# Patient Record
Sex: MALE | Race: BLACK OR AFRICAN AMERICAN | Employment: UNEMPLOYED | URBAN - METROPOLITAN AREA
[De-identification: names, ages, dates, MRNs, and addresses within clinical notes are randomized per-mention and may not be internally consistent; named-entity substitution may affect disease eponyms.]

---

## 2022-05-25 ENCOUNTER — APPOINTMENT (OUTPATIENT)
Dept: GENERAL RADIOLOGY | Age: 52
End: 2022-05-25

## 2022-05-25 ENCOUNTER — HOSPITAL ENCOUNTER (EMERGENCY)
Age: 52
Discharge: HOME | End: 2022-05-25
Attending: EMERGENCY MEDICINE

## 2022-05-25 VITALS
DIASTOLIC BLOOD PRESSURE: 76 MMHG | HEART RATE: 72 BPM | SYSTOLIC BLOOD PRESSURE: 130 MMHG | OXYGEN SATURATION: 100 % | TEMPERATURE: 98.7 F | WEIGHT: 155.9 LBS | RESPIRATION RATE: 18 BRPM

## 2022-05-25 DIAGNOSIS — M25.532 LEFT WRIST PAIN: Primary | ICD-10-CM

## 2022-05-25 PROCEDURE — 99283 EMERGENCY DEPT VISIT LOW MDM: CPT

## 2022-05-25 PROCEDURE — 6370000000 HC RX 637 (ALT 250 FOR IP): Performed by: STUDENT IN AN ORGANIZED HEALTH CARE EDUCATION/TRAINING PROGRAM

## 2022-05-25 PROCEDURE — 73100 X-RAY EXAM OF WRIST: CPT

## 2022-05-25 PROCEDURE — 73110 X-RAY EXAM OF WRIST: CPT

## 2022-05-25 RX ORDER — IBUPROFEN 600 MG/1
600 TABLET ORAL 4 TIMES DAILY PRN
Qty: 40 TABLET | Refills: 0 | Status: SHIPPED | OUTPATIENT
Start: 2022-05-25

## 2022-05-25 RX ORDER — LIDOCAINE 4 G/G
1 PATCH TOPICAL DAILY
Status: DISCONTINUED | OUTPATIENT
Start: 2022-05-25 | End: 2022-05-25 | Stop reason: HOSPADM

## 2022-05-25 RX ORDER — ACETAMINOPHEN 500 MG
500 TABLET ORAL 4 TIMES DAILY PRN
Qty: 120 TABLET | Refills: 0 | Status: SHIPPED | OUTPATIENT
Start: 2022-05-25

## 2022-05-25 RX ORDER — ACETAMINOPHEN 325 MG/1
650 TABLET ORAL ONCE
Status: COMPLETED | OUTPATIENT
Start: 2022-05-25 | End: 2022-05-25

## 2022-05-25 RX ORDER — LIDOCAINE 50 MG/G
1 PATCH TOPICAL DAILY
Qty: 30 PATCH | Refills: 0 | Status: SHIPPED | OUTPATIENT
Start: 2022-05-25

## 2022-05-25 RX ORDER — IBUPROFEN 400 MG/1
400 TABLET ORAL ONCE
Status: COMPLETED | OUTPATIENT
Start: 2022-05-25 | End: 2022-05-25

## 2022-05-25 RX ADMIN — ACETAMINOPHEN 650 MG: 325 TABLET ORAL at 20:34

## 2022-05-25 RX ADMIN — IBUPROFEN 400 MG: 400 TABLET, FILM COATED ORAL at 20:34

## 2022-05-25 ASSESSMENT — ENCOUNTER SYMPTOMS
SHORTNESS OF BREATH: 0
NAUSEA: 0
ABDOMINAL DISTENTION: 0
VOMITING: 0
CHEST TIGHTNESS: 0

## 2022-05-25 ASSESSMENT — PAIN SCALES - GENERAL: PAINLEVEL_OUTOF10: 7

## 2022-05-25 NOTE — ED TRIAGE NOTES
Patient presents to the ED, states he had a mechanical fall 4/19, landed outstretched on his left wrist, C/O pain, also C/O chronic lumbar pain, as he is a

## 2022-05-26 NOTE — ED PROVIDER NOTES
[address] [facility] [address] ENCOUNTER          EM RESIDENT NOTE       Date of evaluation: 5/25/2022    Chief Complaint     Wrist Pain and Back Pain      History of Present Illness     Jose Johnson is a left-hand-dominant 46 y.o. male with no medical problems who presents left wrist pain and back pain. Patient is coming into the emerge department primarily for left wrist pain that has been ongoing for approximately 1 month. Patient states that he fell approximately 1 month ago onto his left wrist and has been having pain since that time. He is a  and has not been able to get into the emergency department for evaluation. Pain is located in his left wrist.  Does not radiate. It is dull/achy. It is moderate nature. It is worse with palpation and certain movements. No alleviating factors. He denies any numbness/tingling or color changes to his left hand. Patient is also complaining of bilateral low back pain that has been ongoing for years due to the fact that he is a  and sits down for his job for prolonged period of time. No history of trauma or falls. He denies any bowel/urinary changes, fevers, IV drug use, saddle anesthesia. Review of Systems     Review of Systems   Constitutional: Negative for chills and fever. HENT: Negative for congestion and drooling. Respiratory: Negative for chest tightness and shortness of breath. Cardiovascular: Negative for chest pain and palpitations. Gastrointestinal: Negative for abdominal distention, nausea and vomiting. Musculoskeletal: Positive for arthralgias. Negative for gait problem. Skin: Negative for rash and wound. Neurological: Negative for dizziness and headaches. Psychiatric/Behavioral: Negative for agitation and confusion. Past Medical, Surgical, Family, and Social History     He has no past medical history on file. He has no past surgical history on file.   His family history is not on file.  He reports that he has never smoked. He has never used smokeless tobacco. He reports that he does not drink alcohol. Medications     Previous Medications    No medications on file       Allergies     He has No Known Allergies. Physical Exam     INITIAL VITALS: BP: 130/76, Temp: 98.7 °F (37.1 °C), Heart Rate: 72, Resp: 18, SpO2: 100 %   Physical Exam  Constitutional:       Appearance: Normal appearance. HENT:      Head: Normocephalic and atraumatic. Mouth/Throat:      Mouth: Mucous membranes are moist.   Eyes:      Extraocular Movements: Extraocular movements intact. Conjunctiva/sclera: Conjunctivae normal.      Pupils: Pupils are equal, round, and reactive to light. Cardiovascular:      Rate and Rhythm: Normal rate and regular rhythm. Pulses: Normal pulses. Pulmonary:      Effort: Pulmonary effort is normal.      Breath sounds: Normal breath sounds. Abdominal:      General: There is no distension. Palpations: Abdomen is soft. Tenderness: There is no guarding or rebound. Musculoskeletal:      Cervical back: Normal range of motion and neck supple. Comments: 2+ radial and DP pulses bilaterally. Patient has some tenderness to palpation along the radial aspect of the left wrist with no acute bony deformity appreciated. There is no pain on axial load, questionable tenderness in the snuffbox. Sensation to the M/U/R nerve are intact. Patient has full range of motion of his left hand with a normal OK sign, normal ranging of his digits, normal finger cross. Cap refill less than 3 seconds in all 5 digits. Patient has no midline tenderness of the C/T/L-spine. No step-off or deformities. 5/5 strength in the bilateral upper and lower extremities otherwise. No gross sensory changes. Skin:     General: Skin is warm and dry. Neurological:      General: No focal deficit present. Mental Status: He is alert and oriented to person, place, and time.  Mental status is at baseline. Psychiatric:         Mood and Affect: Mood normal.         Behavior: Behavior normal.         DiagnosticResults     EKG   None     RADIOLOGY:  XR WRIST LEFT (2 VIEWS)   Final Result      No evidence of fracture. XR WRIST LEFT (MIN 3 VIEWS)   Final Result      No acute osseous findings. LABS:   No results found for this visit on 05/25/22. ED BEDSIDE ULTRASOUND:  None     RECENT VITALS:  BP: 130/76, Temp: 98.7 °F (37.1 °C), Heart Rate: 72,Resp: 18, SpO2: 100 %     Procedures     None     ED Course     Nursing Notes, Past Medical Hx, Past Surgical Hx, Social Hx, Allergies, and Family Hx were reviewed. The patient was given the followingmedications:  Orders Placed This Encounter   Medications    ibuprofen (ADVIL;MOTRIN) tablet 400 mg    acetaminophen (TYLENOL) tablet 650 mg    lidocaine 4 % external patch 1 patch    ibuprofen (ADVIL;MOTRIN) 600 MG tablet     Sig: Take 1 tablet by mouth 4 times daily as needed for Pain     Dispense:  40 tablet     Refill:  0    acetaminophen (TYLENOL) 500 MG tablet     Sig: Take 1 tablet by mouth 4 times daily as needed for Pain     Dispense:  120 tablet     Refill:  0    lidocaine (LIDODERM) 5 %     Sig: Place 1 patch onto the skin daily 12 hours on, 12 hours off. Dispense:  30 patch     Refill:  0       CONSULTS:  None    MEDICAL DECISION MAKING / ASSESSMENT / Sujata Coley Mckinley Fritz is a 46 y.o. male coming into the emergency department primarily for left wrist pain. Vital signs are reassuring. On examination, he has some questionable tenderness in the snuffbox but is neurovascular intact otherwise. Plain films of the left wrist, including dedicated scaphoid images, not reveal any evidence of a fracture. I currently cannot exclude any ligamentous injury and thus and placing the patient in a Velcro splint. He is going to be referred to orthopedic surgery for outpatient evaluation.   Regarding his back pain, this seems to be chronic in nature and due to the fact that he sits for a prolonged period of time on a daily basis as he is a . He has no red flag symptoms and is neurovascular intact with a nonfocal neurological exam and thus do not feel that labs or imaging are beneficial at this time. Return precautions discussed with the patient otherwise he was discharged in stable condition. This patient was also evaluated by the attending physician. All care plans werediscussed and agreed upon. Clinical Impression     1. Left wrist pain        Disposition     PATIENT REFERRED TO:  Mayo Benitez MD  [facility]            DISCHARGE MEDICATIONS:  New Prescriptions    ACETAMINOPHEN (TYLENOL) 500 MG TABLET    Take 1 tablet by mouth 4 times daily as needed for Pain    IBUPROFEN (ADVIL;MOTRIN) 600 MG TABLET    Take 1 tablet by mouth 4 times daily as needed for Pain    LIDOCAINE (LIDODERM) 5 %    Place 1 patch onto the skin daily 12 hours on, 12 hours off.        Jena Roque MD  Resident  05/25/22 [id number]